# Patient Record
Sex: MALE | Race: BLACK OR AFRICAN AMERICAN | HISPANIC OR LATINO | Employment: UNEMPLOYED | URBAN - METROPOLITAN AREA
[De-identification: names, ages, dates, MRNs, and addresses within clinical notes are randomized per-mention and may not be internally consistent; named-entity substitution may affect disease eponyms.]

---

## 2020-09-25 ENCOUNTER — OFFICE VISIT (OUTPATIENT)
Dept: URGENT CARE | Age: 30
End: 2020-09-25
Payer: COMMERCIAL

## 2020-09-25 VITALS
HEART RATE: 74 BPM | WEIGHT: 172 LBS | DIASTOLIC BLOOD PRESSURE: 60 MMHG | RESPIRATION RATE: 18 BRPM | TEMPERATURE: 98 F | BODY MASS INDEX: 31.65 KG/M2 | SYSTOLIC BLOOD PRESSURE: 120 MMHG | HEIGHT: 62 IN | OXYGEN SATURATION: 100 %

## 2020-09-25 DIAGNOSIS — R17 JAUNDICE: Primary | ICD-10-CM

## 2020-09-25 PROCEDURE — 99283 EMERGENCY DEPT VISIT LOW MDM: CPT | Performed by: PHYSICIAN ASSISTANT

## 2020-09-25 PROCEDURE — G0382 LEV 3 HOSP TYPE B ED VISIT: HCPCS | Performed by: PHYSICIAN ASSISTANT

## 2020-09-25 PROCEDURE — 99203 OFFICE O/P NEW LOW 30 MIN: CPT | Performed by: PHYSICIAN ASSISTANT

## 2020-09-25 NOTE — PATIENT INSTRUCTIONS
Follow up with PCP in 3-5 days  Proceed to  ER if symptoms worsen  Discussed ED transfer patient at length  Patient opted for careful monitoring of symptoms  Advised patient needs blood work in the very near future  Monitor symptoms closely  Any increased symptoms report to emergency room immediately  Jaundice   WHAT YOU NEED TO KNOW:   Jaundice is yellowing of your skin and eyes  It is caused by high levels of bilirubin in your body  Bilirubin is a substance that is produced when the liver breaks down red blood cells  Jaundice may be a symptom of liver, pancreas, or gallbladder problems  Genetic disorders, medicines such as acetaminophen, or excess alcohol use may also cause jaundice  DISCHARGE INSTRUCTIONS:   Return to the emergency department if:   · You have severe abdominal pain or a fever  · You suddenly feel lightheaded or faint  Contact your healthcare provider if:   · You begin to have tea-colored urine or pale, gray bowel movements  · Your skin and eyes become more yellow, or other symptoms get worse  · You are confused, or others notice changes in your behavior  · You have questions or concerns about your condition or care  Medicines:   · Medicines  can decrease bilirubin levels and reduce your itching  · Take your medicine as directed  Contact your healthcare provider if you think your medicine is not helping or if you have side effects  Tell him of her if you are allergic to any medicine  Keep a list of the medicines, vitamins, and herbs you take  Include the amounts, and when and why you take them  Bring the list or the pill bottles to follow-up visits  Carry your medicine list with you in case of an emergency  Follow up with your healthcare provider as directed: You will need to return for more tests  Write down your questions so you remember to ask them during your visits  Manage your symptoms:   · Drink more liquids as directed    Liquids help you stay hydrated and urinate more  This helps prevent harm to your kidneys  Ask how much liquid to drink each day and which liquids are best for you  · Eat foods low in fat  Healthy low-fat foods include fruits, vegetables, whole-grain breads, low-fat dairy products, beans, lean meats, and fish  These foods are easier to digest and may help reduce your symptoms  · Do not drink alcohol  Alcohol harms your liver and may make your symptoms worse  © 2017 [employer] Information is for End User's use only and may not be sold, redistributed or otherwise used for commercial purposes  All illustrations and images included in CareNotes® are the copyrighted property of A D A M , Inc  or [employer]  The above information is an  only  It is not intended as medical advice for individual conditions or treatments  Talk to your doctor, nurse or pharmacist before following any medical regimen to see if it is safe and effective for you

## 2020-09-25 NOTE — PROGRESS NOTES
[facility] [employer] [facility]      NAME: Rodrick Pierre is a 27 y o  male  : 1990    MRN: [id number]  DATE: 2020  TIME: 6:12 PM    Assessment and Plan   Jaundice [R17]  1  Jaundice         Patient Instructions     Follow up with PCP in 3-5 days  Proceed to  ER if symptoms worsen  Discussed ED transfer patient at length  Patient opted for careful monitoring of symptoms  Advised patient needs blood work in the very near future  Monitor symptoms closely  Any increased symptoms report to emergency room immediately  Chief Complaint     Chief Complaint   Patient presents with    Eye Pain     symptoms started about 2 days ago  pt states his vision is blurry at times  History of Present Illness       Patient is a 80-year-old male presenting the office for yellow eyes"  Patient states that his symptoms have been ongoing past 2 days in duration  Patient states he has an extensive history liver disease but states that he does not know the particularly use of the disease  Patient states that 1 point his bilirubin was very elevated but was unable to provide any other information  Patient states that yesterday he felt feverish and fatigued but is currently asymptomatic  Patient denies any problems with his ears nose or throat  Patient has any shortness of breath chest tightness chest pain  Patient has any nausea vomiting diarrhea, abdominal pain  Patient denies any urinary symptoms  Patient denies any dizziness or lightheadedness  Patient offers no other complaints at this time  Review of Systems   Review of Systems   Constitutional: Negative  HENT: Negative  Eyes: Negative  Negative for photophobia, pain, discharge, redness, itching and visual disturbance  Respiratory: Negative  Cardiovascular: Negative  Gastrointestinal: Negative  Endocrine: Negative  Genitourinary: Negative  Musculoskeletal: Negative  Skin: Negative  Allergic/Immunologic: Negative  Neurological: Negative  Hematological: Negative  Psychiatric/Behavioral: Negative  Current Medications     No current outpatient medications on file  Current Allergies     Allergies as of 09/25/2020    (No Known Allergies)            The following portions of the patient's history were reviewed and updated as appropriate: allergies, current medications, past family history, past medical history, past social history, past surgical history and problem list      No past medical history on file  No past surgical history on file  No family history on file  Medications have been verified  Objective   /60   Pulse 74   Temp 98 °F (36 7 °C)   Resp 18   Ht 5' 2" (1 575 m)   Wt 78 kg (172 lb)   SpO2 100%   BMI 31 46 kg/m²        Physical Exam     Physical Exam  Vitals signs and nursing note reviewed  Constitutional:       Appearance: He is well-developed  HENT:      Head: Normocephalic  Eyes:      Conjunctiva/sclera: Conjunctivae normal       Pupils: Pupils are equal, round, and reactive to light  Neck:      Musculoskeletal: Normal range of motion  Cardiovascular:      Rate and Rhythm: Normal rate and regular rhythm  Heart sounds: Normal heart sounds  Pulmonary:      Effort: Pulmonary effort is normal       Breath sounds: Normal breath sounds  Skin:     General: Skin is warm  Capillary Refill: Capillary refill takes less than 2 seconds  Coloration: Skin is jaundiced (Minimal)  Findings: No bruising or lesion  Neurological:      Mental Status: He is alert and oriented to person, place, and time  Psychiatric:         Behavior: Behavior normal          Thought Content:  Thought content normal          Judgment: Judgment normal

## 2020-12-07 ENCOUNTER — LAB (OUTPATIENT)
Dept: LAB | Facility: MEDICAL CENTER | Age: 30
End: 2020-12-07
Payer: COMMERCIAL

## 2020-12-07 ENCOUNTER — OFFICE VISIT (OUTPATIENT)
Dept: GASTROENTEROLOGY | Facility: MEDICAL CENTER | Age: 30
End: 2020-12-07
Payer: COMMERCIAL

## 2020-12-07 VITALS
DIASTOLIC BLOOD PRESSURE: 71 MMHG | BODY MASS INDEX: 30.55 KG/M2 | HEART RATE: 70 BPM | WEIGHT: 166 LBS | SYSTOLIC BLOOD PRESSURE: 112 MMHG | HEIGHT: 62 IN | TEMPERATURE: 98.4 F

## 2020-12-07 DIAGNOSIS — E80.6 HYPERBILIRUBINEMIA: ICD-10-CM

## 2020-12-07 DIAGNOSIS — F10.10 ALCOHOL ABUSE: ICD-10-CM

## 2020-12-07 DIAGNOSIS — Z87.19 HISTORY OF BILIARY ATRESIA: ICD-10-CM

## 2020-12-07 DIAGNOSIS — R16.1 SPLENOMEGALY: ICD-10-CM

## 2020-12-07 DIAGNOSIS — E80.6 HYPERBILIRUBINEMIA: Primary | ICD-10-CM

## 2020-12-07 DIAGNOSIS — K74.60 CIRRHOSIS OF LIVER WITHOUT ASCITES, UNSPECIFIED HEPATIC CIRRHOSIS TYPE (HCC): ICD-10-CM

## 2020-12-07 LAB
AFP-TM SERPL-MCNC: 3 NG/ML (ref 0.5–8)
ALBUMIN SERPL BCP-MCNC: 3.4 G/DL (ref 3.5–5)
ALP SERPL-CCNC: 399 U/L (ref 46–116)
ALT SERPL W P-5'-P-CCNC: 62 U/L (ref 12–78)
ANION GAP SERPL CALCULATED.3IONS-SCNC: 5 MMOL/L (ref 4–13)
AST SERPL W P-5'-P-CCNC: 74 U/L (ref 5–45)
BASOPHILS # BLD AUTO: 0.03 THOUSANDS/ΜL (ref 0–0.1)
BASOPHILS NFR BLD AUTO: 1 % (ref 0–1)
BILIRUB DIRECT SERPL-MCNC: 6.1 MG/DL (ref 0–0.2)
BILIRUB SERPL-MCNC: 7.27 MG/DL (ref 0.2–1)
BUN SERPL-MCNC: 7 MG/DL (ref 5–25)
CALCIUM SERPL-MCNC: 10 MG/DL (ref 8.3–10.1)
CHLORIDE SERPL-SCNC: 104 MMOL/L (ref 100–108)
CO2 SERPL-SCNC: 29 MMOL/L (ref 21–32)
CREAT SERPL-MCNC: 0.81 MG/DL (ref 0.6–1.3)
EOSINOPHIL # BLD AUTO: 0.09 THOUSAND/ΜL (ref 0–0.61)
EOSINOPHIL NFR BLD AUTO: 2 % (ref 0–6)
ERYTHROCYTE [DISTWIDTH] IN BLOOD BY AUTOMATED COUNT: 15.5 % (ref 11.6–15.1)
FERRITIN SERPL-MCNC: 237 NG/ML (ref 8–388)
GFR SERPL CREATININE-BSD FRML MDRD: 138 ML/MIN/1.73SQ M
GGT SERPL-CCNC: 156 U/L (ref 5–85)
GLUCOSE P FAST SERPL-MCNC: 103 MG/DL (ref 65–99)
HCT VFR BLD AUTO: 43.7 % (ref 36.5–49.3)
HGB BLD-MCNC: 13.9 G/DL (ref 12–17)
IGA SERPL-MCNC: 358 MG/DL (ref 70–400)
IGG SERPL-MCNC: 1280 MG/DL (ref 700–1600)
IGM SERPL-MCNC: 85 MG/DL (ref 40–230)
IMM GRANULOCYTES # BLD AUTO: 0.05 THOUSAND/UL (ref 0–0.2)
IMM GRANULOCYTES NFR BLD AUTO: 1 % (ref 0–2)
INR PPP: 0.98 (ref 0.84–1.19)
IRON SATN MFR SERPL: 22 %
IRON SERPL-MCNC: 76 UG/DL (ref 65–175)
LYMPHOCYTES # BLD AUTO: 0.93 THOUSANDS/ΜL (ref 0.6–4.47)
LYMPHOCYTES NFR BLD AUTO: 25 % (ref 14–44)
MCH RBC QN AUTO: 29 PG (ref 26.8–34.3)
MCHC RBC AUTO-ENTMCNC: 31.8 G/DL (ref 31.4–37.4)
MCV RBC AUTO: 91 FL (ref 82–98)
MONOCYTES # BLD AUTO: 0.31 THOUSAND/ΜL (ref 0.17–1.22)
MONOCYTES NFR BLD AUTO: 8 % (ref 4–12)
NEUTROPHILS # BLD AUTO: 2.35 THOUSANDS/ΜL (ref 1.85–7.62)
NEUTS SEG NFR BLD AUTO: 63 % (ref 43–75)
NRBC BLD AUTO-RTO: 0 /100 WBCS
PLATELET # BLD AUTO: 204 THOUSANDS/UL (ref 149–390)
PMV BLD AUTO: 11.4 FL (ref 8.9–12.7)
POTASSIUM SERPL-SCNC: 3.8 MMOL/L (ref 3.5–5.3)
PROT SERPL-MCNC: 8 G/DL (ref 6.4–8.2)
PROTHROMBIN TIME: 13 SECONDS (ref 11.6–14.5)
RBC # BLD AUTO: 4.8 MILLION/UL (ref 3.88–5.62)
SODIUM SERPL-SCNC: 138 MMOL/L (ref 136–145)
TIBC SERPL-MCNC: 343 UG/DL (ref 250–450)
WBC # BLD AUTO: 3.76 THOUSAND/UL (ref 4.31–10.16)

## 2020-12-07 PROCEDURE — 85610 PROTHROMBIN TIME: CPT

## 2020-12-07 PROCEDURE — 83550 IRON BINDING TEST: CPT

## 2020-12-07 PROCEDURE — 86038 ANTINUCLEAR ANTIBODIES: CPT

## 2020-12-07 PROCEDURE — 83540 ASSAY OF IRON: CPT

## 2020-12-07 PROCEDURE — 82103 ALPHA-1-ANTITRYPSIN TOTAL: CPT

## 2020-12-07 PROCEDURE — 80076 HEPATIC FUNCTION PANEL: CPT

## 2020-12-07 PROCEDURE — 80048 BASIC METABOLIC PNL TOTAL CA: CPT

## 2020-12-07 PROCEDURE — 82390 ASSAY OF CERULOPLASMIN: CPT

## 2020-12-07 PROCEDURE — 86235 NUCLEAR ANTIGEN ANTIBODY: CPT

## 2020-12-07 PROCEDURE — 82728 ASSAY OF FERRITIN: CPT

## 2020-12-07 PROCEDURE — 85025 COMPLETE CBC W/AUTO DIFF WBC: CPT

## 2020-12-07 PROCEDURE — 86256 FLUORESCENT ANTIBODY TITER: CPT

## 2020-12-07 PROCEDURE — 82105 ALPHA-FETOPROTEIN SERUM: CPT

## 2020-12-07 PROCEDURE — 86376 MICROSOMAL ANTIBODY EACH: CPT

## 2020-12-07 PROCEDURE — 36415 COLL VENOUS BLD VENIPUNCTURE: CPT

## 2020-12-07 PROCEDURE — 82977 ASSAY OF GGT: CPT

## 2020-12-07 PROCEDURE — 82784 ASSAY IGA/IGD/IGG/IGM EACH: CPT

## 2020-12-07 PROCEDURE — 99204 OFFICE O/P NEW MOD 45 MIN: CPT | Performed by: INTERNAL MEDICINE

## 2020-12-07 RX ORDER — HYDROXYZINE HYDROCHLORIDE 25 MG/1
25 TABLET, FILM COATED ORAL EVERY 6 HOURS PRN
COMMUNITY
Start: 2020-11-24

## 2020-12-07 RX ORDER — URSODIOL 300 MG/1
300 CAPSULE ORAL 2 TIMES DAILY
COMMUNITY
Start: 2020-09-30 | End: 2020-12-09

## 2020-12-07 RX ORDER — OMEPRAZOLE 20 MG/1
20 CAPSULE, DELAYED RELEASE ORAL DAILY
COMMUNITY

## 2020-12-08 ENCOUNTER — HOSPITAL ENCOUNTER (OUTPATIENT)
Dept: RADIOLOGY | Facility: HOSPITAL | Age: 30
Discharge: HOME | End: 2020-12-08
Attending: INTERNAL MEDICINE
Payer: COMMERCIAL

## 2020-12-08 DIAGNOSIS — E80.6 HYPERBILIRUBINEMIA: ICD-10-CM

## 2020-12-08 LAB
A1AT SERPL-MCNC: 219 MG/DL (ref 95–164)
ACTIN IGG SERPL-ACNC: 6 UNITS (ref 0–19)
CERULOPLASMIN SERPL-MCNC: 44.5 MG/DL (ref 16–31)
LKM-1 AB SER-ACNC: <20.1 UNITS (ref 0–20)
MITOCHONDRIA M2 IGG SER-ACNC: <20 UNITS (ref 0–20)

## 2020-12-08 PROCEDURE — G1004 CDSM NDSC: HCPCS

## 2020-12-08 PROCEDURE — 74183 MRI ABD W/O CNTR FLWD CNTR: CPT

## 2020-12-08 PROCEDURE — A9585 GADOBUTROL INJECTION: HCPCS | Performed by: INTERNAL MEDICINE

## 2020-12-08 RX ADMIN — GADOBUTROL 8 ML: 604.72 INJECTION INTRAVENOUS at 15:58

## 2020-12-09 ENCOUNTER — TELEPHONE (OUTPATIENT)
Dept: GASTROENTEROLOGY | Facility: CLINIC | Age: 30
End: 2020-12-09

## 2020-12-09 DIAGNOSIS — Q44.2 BILIARY ATRESIA: ICD-10-CM

## 2020-12-09 DIAGNOSIS — K74.60 CIRRHOSIS OF LIVER WITHOUT ASCITES, UNSPECIFIED HEPATIC CIRRHOSIS TYPE (HCC): Primary | ICD-10-CM

## 2020-12-09 DIAGNOSIS — F10.10 ALCOHOL ABUSE: ICD-10-CM

## 2020-12-09 LAB — RYE IGE QN: NEGATIVE

## 2020-12-10 ENCOUNTER — TELEPHONE (OUTPATIENT)
Dept: GASTROENTEROLOGY | Facility: MEDICAL CENTER | Age: 30
End: 2020-12-10

## 2021-01-11 ENCOUNTER — LAB (OUTPATIENT)
Dept: LAB | Facility: MEDICAL CENTER | Age: 31
End: 2021-01-11
Payer: COMMERCIAL

## 2021-01-11 ENCOUNTER — OFFICE VISIT (OUTPATIENT)
Dept: GASTROENTEROLOGY | Facility: MEDICAL CENTER | Age: 31
End: 2021-01-11
Payer: COMMERCIAL

## 2021-01-11 VITALS
TEMPERATURE: 98.6 F | DIASTOLIC BLOOD PRESSURE: 69 MMHG | SYSTOLIC BLOOD PRESSURE: 102 MMHG | WEIGHT: 168 LBS | BODY MASS INDEX: 30.73 KG/M2

## 2021-01-11 DIAGNOSIS — K74.60 CIRRHOSIS OF LIVER WITHOUT ASCITES, UNSPECIFIED HEPATIC CIRRHOSIS TYPE (HCC): ICD-10-CM

## 2021-01-11 DIAGNOSIS — Z87.19 HISTORY OF BILIARY ATRESIA: ICD-10-CM

## 2021-01-11 DIAGNOSIS — K74.60 CIRRHOSIS OF LIVER WITHOUT ASCITES, UNSPECIFIED HEPATIC CIRRHOSIS TYPE (HCC): Primary | ICD-10-CM

## 2021-01-11 DIAGNOSIS — R79.89 ELEVATED LIVER FUNCTION TESTS: ICD-10-CM

## 2021-01-11 LAB
ALBUMIN SERPL BCP-MCNC: 3.3 G/DL (ref 3.5–5)
ALP SERPL-CCNC: 521 U/L (ref 46–116)
ALT SERPL W P-5'-P-CCNC: 45 U/L (ref 12–78)
AST SERPL W P-5'-P-CCNC: 78 U/L (ref 5–45)
BILIRUB DIRECT SERPL-MCNC: 6.23 MG/DL (ref 0–0.2)
BILIRUB SERPL-MCNC: 7.08 MG/DL (ref 0.2–1)
PROT SERPL-MCNC: 8 G/DL (ref 6.4–8.2)

## 2021-01-11 PROCEDURE — 36415 COLL VENOUS BLD VENIPUNCTURE: CPT

## 2021-01-11 PROCEDURE — 80076 HEPATIC FUNCTION PANEL: CPT

## 2021-01-11 PROCEDURE — 99214 OFFICE O/P EST MOD 30 MIN: CPT | Performed by: INTERNAL MEDICINE

## 2021-01-11 NOTE — PROGRESS NOTES
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, alcohol abuse, cirrhosis who presents for follow-up evaluation  ASSESSMENT AND PLAN:      1  Cirrhosis of liver without ascites, unspecified hepatic cirrhosis type ([address] )  2  History of biliary atresia  3  Elevated liver function tests  He was evaluated in the GI office December 2020 after discharge for an outside hospital for new jaundice  He has history of biliary atresia and underwent Kasai procedure as an infant  Imaging at an outside hospital showed new cirrhosis and evidence of portal hypertension  He had significant alcohol use at that time with 1 in a case of beer per day for the past 3-6 months but has since discontinued alcohol use since September  He underwent repeat imaging here showing dilation of his central and peripheral intrahepatic biliary tree  His cirrhosis is likely a combination of heavy alcohol use and 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 given his abnormal imaging concerning for intrahepatic biliary obstruction  I will recheck liver enzymes today to see if there has been any improvement given that he has been 4 months sober from alcohol  He will continue to use hydroxyzine as needed and topical creams for his pruritus  He was referred to [facility] transplant given new cirrhosis, history of biliary atresia and abnormal imaging  I will touch base with the [facility] transplant team to arrange for him to be contacted for an appointment  We discussed he may need intervention of his biliary tree given possible obstruction seen on recent MRI/MRCP  He has no indication for liver transplant at this time, however would be important to establish with the transplant program given his underlying chronic liver disease  MELD 14   Chronic liver disease workup was negative  Cirrhosis is likely combination of alcohol use, 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  Portal hypertension:  EGD 2020 at outside hospital with portal hypertensive gastropathy and no varices  Repeat EGD in 1 year for surveillance   Ascites:  None  Encephalopathy:  None  HCC screening:  AFP negative and abdominal ultrasound without focal lesion  Repeat every 6 months  I reiterated strict alcohol cessation again with him today  Follow-up in 2 months    - Hepatic function panel; Future    ______________________________________________________________________    SUBJECTIVE:  58-year-old male with 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, alcohol abuse, cirrhosis who presents for follow-up evaluation  Patient is Iranian-speaking only and translation is assisted by [employer]  services  He was seen in the GI office December 2020  At that time he had previously presented to an outside hospital with jaundice  At the outside hospital he underwent MRCP showing no biliary ductal dilation, mild hepatic nodularity suggestive of cirrhosis and splenomegaly  He also underwent EGD showing portal hypertensive gastropathy no gastric or esophageal varices  His total bilirubin continue to increase after discharge  He additionally was having heavy alcohol use with a case of beer per day for 3-6 months prior to his presentation and has stopped drinking in September 2020  Interval history:  He underwent repeat MRI/MRCP 12/08/2020 showing moderate central and mild peripheral intrahepatic biliary ductal dilatation with diffusely irregular appearance of the biliary tree  His small-bowel/biliary anastomosis could not be visualized  Additionally there was splenomegaly and prominent portal vein, cirrhotic appearing liver  Hepatic function panel at that time showed total bilirubin 7 2, direct 6 1, alkaline phosphatase 399, AST 74 ALT 62     Prior workup for genetic/metabolic and autoimmune causes of liver disease were unremarkable  He is referred for evaluation with the [facility] transplant team given his complex history, new cirrhosis and abnormal imaging  He states he has not yet been contacted by [facility] to arrange an appointment  He continues to have significant jaundice, pruritus  He reports that he is not sleeping well  He denies abdominal pain  He states his urine is dark and his stool does alternate between brown and a light color  He continues to have abstain from alcohol with his last drink in September 2020  REVIEW OF SYSTEMS IS OTHERWISE NEGATIVE  Ten point review of systems negative other than stated as per HPI    Historical Information   No past medical history on file  No past surgical history on file  Social History   Social History     Substance and Sexual Activity   Alcohol Use Never    Frequency: Never     Social History     Substance and Sexual Activity   Drug Use Not Currently     Social History     Tobacco Use   Smoking Status Never Smoker   Smokeless Tobacco Never Used     No family history on file  Meds/Allergies       Current Outpatient Medications:     hydrOXYzine HCL (ATARAX) 25 mg tablet    omeprazole (PriLOSEC) 20 mg delayed release capsule    No Known Allergies        Objective     Blood pressure 102/69, temperature 98 6 °F (37 °C), temperature source Tympanic, weight 76 2 kg (168 lb)  Body mass index is 30 73 kg/m²  PHYSICAL EXAM:      General Appearance:   Alert, cooperative, no distress   HEENT:   Normocephalic, atraumatic,  Icteric sclera   Neck:  Supple, symmetrical, trachea midline   Lungs:   Clear to auscultation bilaterally; no rales, rhonchi or wheezing; respirations unlabored    Heart[de-identified]   Regular rate and rhythm; no murmur, rub, or gallop     Abdomen:   Soft, non-tender, non-distended; normal bowel sounds; no masses, no organomegaly    Genitalia:   Deferred    Rectal:   Deferred    Extremities:  No cyanosis, clubbing or edema    Pulses:  2+ and symmetric    Skin:  Notable for jaundice, excoriations on his upper and lower back due to pruritus, small spider angiomata on the upper chest    Lymph nodes:  No palpable cervical lymphadenopathy        Lab Results:   No visits with results within 1 Day(s) from this visit     Latest known visit with results is:   Lab on 12/07/2020   Component Date Value    AFP TUMOR MARKER 12/07/2020 3 0     LUPE 12/07/2020 Negative     Mitochondrial Ab 12/07/2020 <20 0     Smooth Muscle Ab 12/07/2020 6     IGA 12/07/2020 358 0     IGG 12/07/2020 1,280 0     IGM 12/07/2020 85 0     Liver-Kidney Microsomal * 12/07/2020 <20 1     Total Bilirubin 12/07/2020 7 27*    Bilirubin, Direct 12/07/2020 6 10*    Alkaline Phosphatase 12/07/2020 399*    AST 12/07/2020 74*    ALT 12/07/2020 62     Total Protein 12/07/2020 8 0     Albumin 12/07/2020 3 4*    Protime 12/07/2020 13 0     INR 12/07/2020 0 98     WBC 12/07/2020 3 76*    RBC 12/07/2020 4 80     Hemoglobin 12/07/2020 13 9     Hematocrit 12/07/2020 43 7     MCV 12/07/2020 91     MCH 12/07/2020 29 0     MCHC 12/07/2020 31 8     RDW 12/07/2020 15 5*    MPV 12/07/2020 11 4     Platelets 02/52/1252 204     nRBC 12/07/2020 0     Neutrophils Relative 12/07/2020 63     Immat GRANS % 12/07/2020 1     Lymphocytes Relative 12/07/2020 25     Monocytes Relative 12/07/2020 8     Eosinophils Relative 12/07/2020 2     Basophils Relative 12/07/2020 1     Neutrophils Absolute 12/07/2020 2 35     Immature Grans Absolute 12/07/2020 0 05     Lymphocytes Absolute 12/07/2020 0 93     Monocytes Absolute 12/07/2020 0 31     Eosinophils Absolute 12/07/2020 0 09     Basophils Absolute 12/07/2020 0 03     Sodium 12/07/2020 138     Potassium 12/07/2020 3 8     Chloride 12/07/2020 104     CO2 12/07/2020 29     ANION GAP 12/07/2020 5     BUN 12/07/2020 7     Creatinine 12/07/2020 0 81     Glucose, Fasting 12/07/2020 103*    Calcium 12/07/2020 10 0     eGFR 12/07/2020 138     GGT 12/07/2020 156*    A-1 Antitrypsin 12/07/2020 219*    Ceruloplasmin 12/07/2020 44 5*    Iron Saturation 12/07/2020 22     TIBC 12/07/2020 343     Iron 12/07/2020 76     Ferritin 12/07/2020 237          Radiology Results:   No results found

## 2021-01-12 NOTE — RESULT ENCOUNTER NOTE
My medical assistant will call patient with results  Please let him know that his bilirubin and liver function tests are overall stable    We have called [facility] and they will be in contact with him to schedule an appointment soon

## 2021-01-18 ENCOUNTER — TELEPHONE (OUTPATIENT)
Dept: GASTROENTEROLOGY | Facility: MEDICAL CENTER | Age: 31
End: 2021-01-18

## 2021-01-18 NOTE — TELEPHONE ENCOUNTER
Pt called looking for [facility] phone number, was supposed to have a call from them to determine care, never called him-gave pt phone number for Dr Barry Villegas office

## 2021-01-26 ENCOUNTER — OFFICE VISIT (OUTPATIENT)
Dept: GASTROENTEROLOGY | Facility: MEDICAL CENTER | Age: 31
End: 2021-01-26
Payer: COMMERCIAL

## 2021-01-26 ENCOUNTER — PREP FOR PROCEDURE (OUTPATIENT)
Dept: GASTROENTEROLOGY | Facility: CLINIC | Age: 31
End: 2021-01-26

## 2021-01-26 VITALS
BODY MASS INDEX: 30 KG/M2 | TEMPERATURE: 98.2 F | WEIGHT: 164 LBS | SYSTOLIC BLOOD PRESSURE: 110 MMHG | DIASTOLIC BLOOD PRESSURE: 68 MMHG | HEART RATE: 75 BPM

## 2021-01-26 DIAGNOSIS — Z87.19 HISTORY OF BILIARY ATRESIA: ICD-10-CM

## 2021-01-26 DIAGNOSIS — K74.60 CIRRHOSIS OF LIVER WITHOUT ASCITES, UNSPECIFIED HEPATIC CIRRHOSIS TYPE (HCC): Primary | ICD-10-CM

## 2021-01-26 DIAGNOSIS — E80.6 HYPERBILIRUBINEMIA: Primary | ICD-10-CM

## 2021-01-26 PROCEDURE — 99214 OFFICE O/P EST MOD 30 MIN: CPT | Performed by: INTERNAL MEDICINE

## 2021-01-26 NOTE — PROGRESS NOTES
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  He has recent elevated total bilirubin and imaging showing cirrhosis and moderate central and mild peripheral intrahepatic biliary ductal dilatation with diffusely irregular appearance of the biliary tree  His increased liver function tests in September 2020 was in the setting of significant heavy alcohol use however he has abstained from alcohol for the past 4 months  He was referred to [facility] hepatology however missed their phone call for visit on 01/18  We have verify that [facility] has his correct number and they will be calling him for rescheduling of his visit  He and his partner today are requesting hospitalization to decrease his bilirubin level  I discussed that his elevated bilirubin is likely a combination of underlying cirrhosis and possible underlying intrahepatic biliary obstructive component given his abnormal MRI/MRCP  I discussed this case with our advanced endoscopy team and they will attempt push enteroscopy/ ERCP to try to reach his small-bowel/hepatic anastomosis and to evaluate if any intervention can be performed to relieve possible obstruction  I discussed this with the patient  I also discussed that if his area of obstruction is too proximal and may not be amenable to endoscopic treatment  He will still keep his appointment at [facility] for further evaluation     MELD 15  Chronic liver disease workup was negative    Cirrhosis is likely combination of alcohol use, 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  Portal hypertension:  EGD 2020 at outside hospital with portal hypertensive gastropathy and no varices  Repeat EGD in 1 year for surveillance   Ascites:  None  Encephalopathy:  None  HCC screening:  AFP negative  Recent abdominal ultrasound with hyperechoic lesion possible fatty infiltration however ultrasound 1 month prior and MRI and abdominal ultrasound one month prior were unremarkable  I reiterated strict alcohol cessation again with him today  Follow-up in 3 months     ______________________________________________________________________    SUBJECTIVE:  55-year-old male with 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, alcohol abuse, cirrhosis who presents for follow-up evaluation  Patient is English-speaking only and translation is assisted by [employer]  services  Interval history:  He was last seen in the office 2 weeks ago  He was referred to [facility] about autoimmune hepatitis  He presented to the emergency room yesterday with complaints of itching  Total bilirubin was 10 3 which was increased from 7 0 2 weeks prior  He also underwent abdominal ultrasound showing no biliary dilatation, hyperechoic hepatic lesion that may represent focal fatty infiltration  He continues to have diffuse pruritus which wakes him at night  He tried hydroxyzine and cream with no improvement  He has continued to abstain from alcohol for the past 4 months  His last drink was in September  He was scheduled with [facility] on 01/18 and further documentation was called 3 times but had no answer  Reports that [facility] had the wrong number and he did not receive their phone call  Prior history:   :He was seen in the GI office December 2020  At that time he had previously presented to an outside hospital with jaundice    At the outside hospital he underwent MRCP showing no biliary ductal dilation, mild hepatic nodularity suggestive of cirrhosis and splenomegaly  He also underwent EGD showing portal hypertensive gastropathy no gastric or esophageal varices  His total bilirubin continue to increase after discharge  He additionally was having heavy alcohol use with a case of beer per day for 3-6 months prior to his presentation and has stopped drinking in September 2020       He underwent repeat MRI/MRCP 12/08/2020 showing moderate central and mild peripheral intrahepatic biliary ductal dilatation with diffusely irregular appearance of the biliary tree  His small-bowel/biliary anastomosis could not be visualized  Additionally there was splenomegaly and prominent portal vein, cirrhotic appearing liver  Hepatic function panel at that time showed total bilirubin 7 2, direct 6 1, alkaline phosphatase 399, AST 74 ALT 62  Prior workup for genetic/metabolic and autoimmune causes of liver disease were unremarkable  He is referred for evaluation with the [facility]\"" transplant team given his complex history, new cirrhosis and abnormal imaging          REVIEW OF SYSTEMS IS OTHERWISE NEGATIVE  Ten point review of systems is negative other than stated as per HPI    Historical Information   History reviewed  No pertinent past medical history  History reviewed  No pertinent surgical history  Social History   Social History     Substance and Sexual Activity   Alcohol Use Never    Frequency: Never     Social History     Substance and Sexual Activity   Drug Use Not Currently     Social History     Tobacco Use   Smoking Status Never Smoker   Smokeless Tobacco Never Used     History reviewed  No pertinent family history  Meds/Allergies       Current Outpatient Medications:     hydrOXYzine HCL (ATARAX) 25 mg tablet    omeprazole (PriLOSEC) 20 mg delayed release capsule  No current facility-administered medications for this visit  No Known Allergies        Objective     Blood pressure 110/68, pulse 75, temperature 98 2 °F (36 8 °C), weight 74 4 kg (164 lb)   Body mass index is 30 kg/m²  PHYSICAL EXAM:      General Appearance:   Alert, cooperative, no distress   HEENT:   Normocephalic, atraumatic,  Icteric sclera   Neck:  Supple, symmetrical, trachea midline   Lungs:   Clear to auscultation bilaterally; no rales, rhonchi or wheezing; respirations unlabored    Heart[de-identified]   Regular rate and rhythm; no murmur, rub, or gallop  Abdomen:   Soft, non-tender, non-distended; normal bowel sounds; no masses, no organomegaly    Genitalia:   Deferred    Rectal:   Deferred    Extremities:  No cyanosis, clubbing or edema    Pulses:  2+ and symmetric    Skin:  Jaundice, excoriations on arm and upper back due to itching   Lymph nodes:  No palpable cervical lymphadenopathy        Lab Results:   No visits with results within 1 Day(s) from this visit  Latest known visit with results is:   Lab on 01/11/2021   Component Date Value    Total Bilirubin 01/11/2021 7 08*    Bilirubin, Direct 01/11/2021 6 23*    Alkaline Phosphatase 01/11/2021 521*    AST 01/11/2021 78*    ALT 01/11/2021 45     Total Protein 01/11/2021 8 0     Albumin 01/11/2021 3 3*         Radiology Results:   No results found

## 2021-02-04 ENCOUNTER — TELEPHONE (OUTPATIENT)
Dept: GASTROENTEROLOGY | Facility: CLINIC | Age: 31
End: 2021-02-04

## 2021-02-04 NOTE — TELEPHONE ENCOUNTER
Kuldeep Huang conference call yesterday [facility] referral was closed  He is still seeing Hepatology on 1/22/21  On 1/25 he was at [facility] ER with yellow skin all over  Was discharged

## 2021-02-08 ENCOUNTER — TELEPHONE (OUTPATIENT)
Dept: GASTROENTEROLOGY | Facility: CLINIC | Age: 31
End: 2021-02-08

## 2021-02-08 NOTE — TELEPHONE ENCOUNTER
----- Message from Maryanne Ronquillo MD sent at 2/6/2021  3:46 PM EST -----  [facility]  Surgery staff : please help schedule this patient for an ERCP with me next week (Friday?) or the following week (18th?)  Please update me  Thank you       Dorothy Garcia

## 2021-02-08 NOTE — TELEPHONE ENCOUNTER
ERCP scheduled on 2/18/21 with Dr Franks at [facility]  I gave pt verbal instructions/emailed to [email or web address]

## 2021-02-17 ENCOUNTER — ANESTHESIA EVENT (OUTPATIENT)
Dept: GASTROENTEROLOGY | Facility: HOSPITAL | Age: 31
End: 2021-02-17

## 2021-02-18 ENCOUNTER — HOSPITAL ENCOUNTER (OUTPATIENT)
Dept: RADIOLOGY | Facility: HOSPITAL | Age: 31
Discharge: HOME | End: 2021-02-18
Payer: COMMERCIAL

## 2021-02-18 ENCOUNTER — ANESTHESIA (OUTPATIENT)
Dept: GASTROENTEROLOGY | Facility: HOSPITAL | Age: 31
End: 2021-02-18

## 2021-02-18 ENCOUNTER — HOSPITAL ENCOUNTER (OUTPATIENT)
Dept: GASTROENTEROLOGY | Facility: HOSPITAL | Age: 31
Setting detail: OUTPATIENT SURGERY
Discharge: HOME | End: 2021-02-18
Attending: INTERNAL MEDICINE
Payer: COMMERCIAL

## 2021-02-18 VITALS
HEIGHT: 62 IN | DIASTOLIC BLOOD PRESSURE: 57 MMHG | TEMPERATURE: 98.2 F | SYSTOLIC BLOOD PRESSURE: 103 MMHG | OXYGEN SATURATION: 97 % | BODY MASS INDEX: 30.18 KG/M2 | HEART RATE: 102 BPM | WEIGHT: 164 LBS | RESPIRATION RATE: 18 BRPM

## 2021-02-18 VITALS — HEART RATE: 113 BPM

## 2021-02-18 DIAGNOSIS — E80.6 HYPERBILIRUBINEMIA: ICD-10-CM

## 2021-02-18 DIAGNOSIS — Z87.19 HISTORY OF BILIARY ATRESIA: ICD-10-CM

## 2021-02-18 PROCEDURE — 74018 RADEX ABDOMEN 1 VIEW: CPT

## 2021-02-18 PROCEDURE — 43239 EGD BIOPSY SINGLE/MULTIPLE: CPT | Performed by: INTERNAL MEDICINE

## 2021-02-18 PROCEDURE — 43251 EGD REMOVE LESION SNARE: CPT | Performed by: INTERNAL MEDICINE

## 2021-02-18 PROCEDURE — 88305 TISSUE EXAM BY PATHOLOGIST: CPT | Performed by: PATHOLOGY

## 2021-02-18 RX ORDER — PROPOFOL 10 MG/ML
INJECTION, EMULSION INTRAVENOUS AS NEEDED
Status: DISCONTINUED | OUTPATIENT
Start: 2021-02-18 | End: 2021-02-18

## 2021-02-18 RX ORDER — FENTANYL CITRATE 50 UG/ML
INJECTION, SOLUTION INTRAMUSCULAR; INTRAVENOUS AS NEEDED
Status: DISCONTINUED | OUTPATIENT
Start: 2021-02-18 | End: 2021-02-18

## 2021-02-18 RX ORDER — SODIUM CHLORIDE, SODIUM LACTATE, POTASSIUM CHLORIDE, CALCIUM CHLORIDE 600; 310; 30; 20 MG/100ML; MG/100ML; MG/100ML; MG/100ML
INJECTION, SOLUTION INTRAVENOUS CONTINUOUS PRN
Status: DISCONTINUED | OUTPATIENT
Start: 2021-02-18 | End: 2021-02-18

## 2021-02-18 RX ORDER — LIDOCAINE HYDROCHLORIDE 10 MG/ML
INJECTION, SOLUTION EPIDURAL; INFILTRATION; INTRACAUDAL; PERINEURAL AS NEEDED
Status: DISCONTINUED | OUTPATIENT
Start: 2021-02-18 | End: 2021-02-18

## 2021-02-18 RX ORDER — SUCCINYLCHOLINE/SOD CL,ISO/PF 100 MG/5ML
SYRINGE (ML) INTRAVENOUS AS NEEDED
Status: DISCONTINUED | OUTPATIENT
Start: 2021-02-18 | End: 2021-02-18

## 2021-02-18 RX ORDER — ONDANSETRON 2 MG/ML
INJECTION INTRAMUSCULAR; INTRAVENOUS AS NEEDED
Status: DISCONTINUED | OUTPATIENT
Start: 2021-02-18 | End: 2021-02-18

## 2021-02-18 RX ORDER — DEXAMETHASONE SODIUM PHOSPHATE 10 MG/ML
INJECTION, SOLUTION INTRAMUSCULAR; INTRAVENOUS AS NEEDED
Status: DISCONTINUED | OUTPATIENT
Start: 2021-02-18 | End: 2021-02-18

## 2021-02-18 RX ORDER — ROCURONIUM BROMIDE 10 MG/ML
INJECTION, SOLUTION INTRAVENOUS AS NEEDED
Status: DISCONTINUED | OUTPATIENT
Start: 2021-02-18 | End: 2021-02-18

## 2021-02-18 RX ADMIN — PHENYLEPHRINE HYDROCHLORIDE 100 MCG: 10 INJECTION INTRAVENOUS at 15:31

## 2021-02-18 RX ADMIN — PROPOFOL 200 MG: 10 INJECTION, EMULSION INTRAVENOUS at 14:50

## 2021-02-18 RX ADMIN — ROCURONIUM BROMIDE 30 MG: 50 INJECTION, SOLUTION INTRAVENOUS at 15:32

## 2021-02-18 RX ADMIN — FENTANYL CITRATE 100 MCG: 50 INJECTION INTRAMUSCULAR; INTRAVENOUS at 14:50

## 2021-02-18 RX ADMIN — ONDANSETRON 4 MG: 2 INJECTION INTRAMUSCULAR; INTRAVENOUS at 14:56

## 2021-02-18 RX ADMIN — DEXAMETHASONE SODIUM PHOSPHATE 4 MG: 10 INJECTION, SOLUTION INTRAMUSCULAR; INTRAVENOUS at 14:56

## 2021-02-18 RX ADMIN — ROCURONIUM BROMIDE 10 MG: 50 INJECTION, SOLUTION INTRAVENOUS at 14:54

## 2021-02-18 RX ADMIN — ROCURONIUM BROMIDE 20 MG: 50 INJECTION, SOLUTION INTRAVENOUS at 16:18

## 2021-02-18 RX ADMIN — SUGAMMADEX 200 MG: 100 INJECTION, SOLUTION INTRAVENOUS at 16:55

## 2021-02-18 RX ADMIN — LIDOCAINE HYDROCHLORIDE 50 MG: 10 INJECTION, SOLUTION EPIDURAL; INFILTRATION; INTRACAUDAL; PERINEURAL at 14:50

## 2021-02-18 RX ADMIN — FENTANYL CITRATE 50 MCG: 50 INJECTION INTRAMUSCULAR; INTRAVENOUS at 16:49

## 2021-02-18 RX ADMIN — Medication 100 MG: at 14:50

## 2021-02-18 RX ADMIN — SODIUM CHLORIDE, SODIUM LACTATE, POTASSIUM CHLORIDE, AND CALCIUM CHLORIDE: .6; .31; .03; .02 INJECTION, SOLUTION INTRAVENOUS at 14:48

## 2021-02-18 RX ADMIN — PHENYLEPHRINE HYDROCHLORIDE 100 MCG: 10 INJECTION INTRAVENOUS at 15:09

## 2021-02-18 RX ADMIN — ROCURONIUM BROMIDE 10 MG: 50 INJECTION, SOLUTION INTRAVENOUS at 15:05

## 2021-02-18 NOTE — H&P
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  Presents with jaundice and imaging finding of duct dilation  Review of Systems    Historical Information   History reviewed  No pertinent past medical history  History reviewed  No pertinent surgical history  Social History   Social History     Substance and Sexual Activity   Alcohol Use Never    Frequency: Never     Social History     Substance and Sexual Activity   Drug Use Not Currently     Social History     Tobacco Use   Smoking Status Never Smoker   Smokeless Tobacco Never Used     History reviewed  No pertinent family history  Meds/Allergies     (Not in a hospital admission)      No Known Allergies    Objective     /61   Pulse (!) 108   Temp 99 8 °F (37 7 °C) (Tympanic)   Resp 18   Ht 5' 2" (1 575 m)   Wt 74 4 kg (164 lb)   SpO2 100%   BMI 30 00 kg/m²       PHYSICAL EXAM    Gen: NAD  CV: RRR  CHEST: Clear  ABD: soft, NT/ND  EXT: no edema  Neuro: AAO      ASSESSMENT/PLAN:  This is a 27y o  year old male here for ERCP for evaluation of biliary enteric anastomosis in a patient with a history of Kasai procedure who presents with jaundice and dilated bile ducts  PLAN:   Procedure:  ERCP  Discussed the altered anatomy with the patient  This may prove the procedure to be challenging and he may not be able to complete it  He is agreeable with an attempt

## 2021-02-18 NOTE — ANESTHESIA POSTPROCEDURE EVALUATION
Post-Op Assessment Note    CV Status:  Stable  Pain Score: 0    Pain management: adequate     Mental Status:  Awake and sleepy   PONV Controlled:  None   Airway Patency:  Patent   Two or more mitigation strategies used for obstructive sleep apnea   Post Op Vitals Reviewed: Yes      Staff: CRNA         No complications documented      /56 (02/18/21 1713)    Temp 98 2 °F (36 8 °C) (02/18/21 1713)    Pulse 93 (02/18/21 1713)   Resp 20 (02/18/21 1713)    SpO2 99 % (02/18/21 1713)

## 2021-02-18 NOTE — ANESTHESIA PREPROCEDURE EVALUATION
Procedure:  ERCP    Relevant Problems   ANESTHESIA (within normal limits)      CARDIO (within normal limits)      ENDO (within normal limits)      GI/HEPATIC (within normal limits)      /RENAL (within normal limits)      GYN (within normal limits)      HEMATOLOGY (within normal limits)      MUSCULOSKELETAL (within normal limits)      NEURO/PSYCH   (+) History of biliary atresia      PULMONARY (within normal limits)   NPO verified      Results for Martina Lundy (MRN [id number]) as of 2/18/2021 13:30   Ref  Range 12/7/2020 12:58   WBC Latest Ref Range: 4 31 - 10 16 Thousand/uL 3 76 (L)   Red Blood Cell Count Latest Ref Range: 3 88 - 5 62 Million/uL 4 80   Hemoglobin Latest Ref Range: 12 0 - 17 0 g/dL 13 9   HCT Latest Ref Range: 36 5 - 49 3 % 43 7   MCV Latest Ref Range: 82 - 98 fL 91   MCH Latest Ref Range: 26 8 - 34 3 pg 29 0   MCHC Latest Ref Range: 31 4 - 37 4 g/dL 31 8   RDW Latest Ref Range: 11 6 - 15 1 % 15 5 (H)   Platelet Count Latest Ref Range: 149 - 390 Thousands/uL 204   MPV Latest Ref Range: 8 9 - 12 7 fL 11 4   nRBC Latest Units: /100 WBCs 0     Physical Exam    Airway    Mallampati score: III  TM Distance: >3 FB  Neck ROM: full     Dental   Comment: Edentulous   ,     Cardiovascular      Pulmonary      Other Findings   Scleral icterus         Anesthesia Plan  ASA Score- 3     Anesthesia Type- general with ASA Monitors  Additional Monitors:   Airway Plan: ETT  Plan Factors-Exercise tolerance (METS): >4 METS  Chart reviewed  EKG reviewed  Imaging results reviewed  Existing labs reviewed  Patient summary reviewed  Induction- intravenous  Postoperative Plan- Plan for postoperative opioid use  Planned trial extubation    Informed Consent- Anesthetic plan and risks discussed with patient  I personally reviewed this patient with the CRNA  Discussed and agreed on the Anesthesia Plan with the CRNA  Lucas Wallace

## 2021-02-22 ENCOUNTER — TELEPHONE (OUTPATIENT)
Dept: GASTROENTEROLOGY | Facility: MEDICAL CENTER | Age: 31
End: 2021-02-22

## 2021-02-22 NOTE — TELEPHONE ENCOUNTER
Patients aunt called wanting to speak with Dr Inga Leonard  I advised that she is not listed on the patients communication consent so we would not be able to speak with her in regards to the patients care without the patients consent  Patients aunt stated that he is very depressed with everything going on and she would like to make sure he fully understands everything that is happening and his next steps   I advised her that Dr Inga Leonard did speak with the patient and if he has any additional questions to call our office

## 2021-02-22 NOTE — TELEPHONE ENCOUNTER
Patient called in and stated that he would like to speak with Dr Jeremy Coe in regards to hospitals  He stated that he was told to call by Dr Garrett Rivera last Thursday after his ERCP

## 2021-02-22 NOTE — TELEPHONE ENCOUNTER
Thank you  I spoke with him  I contacted Dr Dennie Gallo at [facility] letting her know the procedure last week was unable to reach his hepatobiliary anastomosis  She will discuss with the team at [facility] and review his imaging as well  We discussed that overall he may need a liver transplant in the future and [facility] will discuss timing of this, and whether  vs living donor evaluation

## 2021-02-26 ENCOUNTER — TELEPHONE (OUTPATIENT)
Dept: GASTROENTEROLOGY | Facility: MEDICAL CENTER | Age: 31
End: 2021-02-26

## 2021-02-26 NOTE — TELEPHONE ENCOUNTER
I called the patient back  I told him I spoke with Dr Riky Peguero from [facility] who said that they will contact him regarding scheduling repeat ERCP attempt to reach his anastomosis  He stated he received a call from Dr Riky Peguero this afternoon and they will be calling him next week to schedule

## 2021-03-01 ENCOUNTER — TELEPHONE (OUTPATIENT)
Dept: GASTROENTEROLOGY | Facility: MEDICAL CENTER | Age: 31
End: 2021-03-01

## 2021-03-01 NOTE — TELEPHONE ENCOUNTER
----- Message from Milagros Torres MD sent at 2/25/2021 11:44 AM EST -----  Please call patient :  Benign polyp in the esophagus removed  Stomach biopsies did not show any infection  Follow up with Dr Sharri Schmitt

## 2021-03-01 NOTE — TELEPHONE ENCOUNTER
TERRIE    Called dylan and he received a phone call this morning to let him know [facility] does not take his insurance [id number] referral placed 12/10/2020  Referral for transplant closed 12/22/2020, but he was seeing Dr Magdalene Valadez in Hepatology       [place]et says will be going to [place]  He needs a referral placed and records sent to [facility]  Advised him to also call [facility] for records to be sent to [place]  He's upset about having to go somewhere else  Something did not seem quite right  Tila's referral was placed 12/10/20 and was cleared by financial  He had 3 appointments with Dr Alexandre Tony and now he has outstanding debt  I called and spoke to Hepatology and asked about patient's insurance  They were confused and gave me the the [place] ID number as his insurance number  It was what they used to bill claims  ??    Tila will call [facility] billing tomorrow to inquire about his insurance info  I have conference call with [facility] transplant team on Wednesday  I will bring this up also  He has been working with [facility] for the last 2 month and all of a sudden, his insurance is not taken  Patient will call me after he talks with [facility]  I'd like to make sure he must change to [place], before we place referral and fax info tothem

## 2021-03-01 NOTE — TELEPHONE ENCOUNTER
Pt called stating he received a call from [facility] stating they will need his records faxed to them at [phone] so they can view them and determine if they will schedule him an appt

## 2021-03-02 NOTE — TELEPHONE ENCOUNTER
Yes, he has had a telemedicine visit with [facility] and I recently spoke with Dr Kori Fernandes who was going to arrange for an ERCP for him and to start listing him for transplant  She did not mention any issues with his insurance at that time  Thank you for looking into it

## 2021-03-02 NOTE — TELEPHONE ENCOUNTER
TERRIE    Spoke with dylan  He did not get through to [facility] billing last night  A male nurse called him yesterday and told him someone will call him today to schedule the ERCP  He is thankful that [facility] will schedule the ERCP  Apparently everything is good with liver transpalnt program  He also has an appt with you on 3    I have a Conference call tomorrow with [facility]  I will ask them if they can look into this for me

## 2021-03-03 NOTE — TELEPHONE ENCOUNTER
Please see encounter form 2/26  Spoke with patient daily for the last 3 days, trying to understand his insurance problem

## 2021-03-03 NOTE — TELEPHONE ENCOUNTER
TERRIE    Had [facility] conference this morning for updates  Questioned about his insurance not being par with [facility]  Sagar Carter, who actually spoke to kami yesterday, told me that Liver Transplant does not take [facility]  but Hepatology does  He received a call today from [facility]  He has his ERCP scheduled for tomorrow, but now his insurance does not cover the hospital where he was going to have his ERCP  He was told someone will contact Dr Jamaal Bates to see what can be done  Dario Bolaños called me to help, but I cannot help him with this  He understands

## 2021-03-03 NOTE — TELEPHONE ENCOUNTER
Thank you   It sounds like [facility] needs to be the contact to help settle this issue, whether he be can continue to be seen by hepatology and/or transplant there

## 2021-03-09 NOTE — TELEPHONE ENCOUNTER
Christina Pierre is asking for help to find out what is going on at [facility] with the EUS  He is getting inconsistent information  He has the ERCP scheduled, then it was cancelled - he did not know why  Apparently his insurance covers Hepatology, but not Liver transplant  [address] and left message to get a call back to discuss

## 2021-03-09 NOTE — TELEPHONE ENCOUNTER
TERRIE Cox, Hepatology physician, called this morning to explain what is going on at [facility]  She states, this is an insurance concern  Hepatology takes [place] but Liver Transplant does not  Any procedures are considered with the Liver Transplant practice, not Hepatology  Dr Cleveland Damon does not understand why the insurance works like this between Hepatology and liver Transplant  There are 3 facilities he is able to go to and patient is choosing [facility], Dr Cleveland Damon has contacts at [facility] and is willing to get him established at [facility]  Dr Cleveland Damon will call patient today  I spoke to patient and he is aware Dr Cleveland Damon will be calling him  He is eager to go to [facility]

## 2021-03-15 ENCOUNTER — LAB (OUTPATIENT)
Dept: LAB | Facility: MEDICAL CENTER | Age: 31
End: 2021-03-15
Payer: COMMERCIAL

## 2021-03-15 ENCOUNTER — OFFICE VISIT (OUTPATIENT)
Dept: GASTROENTEROLOGY | Facility: MEDICAL CENTER | Age: 31
End: 2021-03-15
Payer: COMMERCIAL

## 2021-03-15 VITALS
BODY MASS INDEX: 27.6 KG/M2 | SYSTOLIC BLOOD PRESSURE: 103 MMHG | TEMPERATURE: 98.7 F | HEART RATE: 75 BPM | DIASTOLIC BLOOD PRESSURE: 67 MMHG | WEIGHT: 150 LBS | HEIGHT: 62 IN

## 2021-03-15 DIAGNOSIS — Z87.19 HISTORY OF BILIARY ATRESIA: ICD-10-CM

## 2021-03-15 DIAGNOSIS — K74.60 CIRRHOSIS OF LIVER WITHOUT ASCITES, UNSPECIFIED HEPATIC CIRRHOSIS TYPE (HCC): Primary | ICD-10-CM

## 2021-03-15 DIAGNOSIS — E80.6 HYPERBILIRUBINEMIA: ICD-10-CM

## 2021-03-15 DIAGNOSIS — R63.4 WEIGHT LOSS, UNINTENTIONAL: ICD-10-CM

## 2021-03-15 DIAGNOSIS — K74.60 CIRRHOSIS OF LIVER WITHOUT ASCITES, UNSPECIFIED HEPATIC CIRRHOSIS TYPE (HCC): ICD-10-CM

## 2021-03-15 PROBLEM — K74.5 BILIARY CIRRHOSIS (HCC): Status: ACTIVE | Noted: 2021-03-15

## 2021-03-15 LAB
ALBUMIN SERPL BCP-MCNC: 3.5 G/DL (ref 3.5–5)
ALP SERPL-CCNC: 965 U/L (ref 46–116)
ALT SERPL W P-5'-P-CCNC: 91 U/L (ref 12–78)
AST SERPL W P-5'-P-CCNC: 125 U/L (ref 5–45)
BASOPHILS # BLD AUTO: 0.02 THOUSANDS/ΜL (ref 0–0.1)
BASOPHILS NFR BLD AUTO: 1 % (ref 0–1)
BILIRUB DIRECT SERPL-MCNC: 6.4 MG/DL (ref 0–0.2)
BILIRUB SERPL-MCNC: 7.76 MG/DL (ref 0.2–1)
EOSINOPHIL # BLD AUTO: 0.18 THOUSAND/ΜL (ref 0–0.61)
EOSINOPHIL NFR BLD AUTO: 4 % (ref 0–6)
ERYTHROCYTE [DISTWIDTH] IN BLOOD BY AUTOMATED COUNT: 15.4 % (ref 11.6–15.1)
HCT VFR BLD AUTO: 46.2 % (ref 36.5–49.3)
HGB BLD-MCNC: 14.6 G/DL (ref 12–17)
IGA SERPL-MCNC: 475 MG/DL (ref 70–400)
IMM GRANULOCYTES # BLD AUTO: 0.02 THOUSAND/UL (ref 0–0.2)
IMM GRANULOCYTES NFR BLD AUTO: 1 % (ref 0–2)
INR PPP: 1.17 (ref 0.84–1.19)
LYMPHOCYTES # BLD AUTO: 0.8 THOUSANDS/ΜL (ref 0.6–4.47)
LYMPHOCYTES NFR BLD AUTO: 19 % (ref 14–44)
MCH RBC QN AUTO: 28.6 PG (ref 26.8–34.3)
MCHC RBC AUTO-ENTMCNC: 31.6 G/DL (ref 31.4–37.4)
MCV RBC AUTO: 91 FL (ref 82–98)
MONOCYTES # BLD AUTO: 0.28 THOUSAND/ΜL (ref 0.17–1.22)
MONOCYTES NFR BLD AUTO: 7 % (ref 4–12)
NEUTROPHILS # BLD AUTO: 2.88 THOUSANDS/ΜL (ref 1.85–7.62)
NEUTS SEG NFR BLD AUTO: 68 % (ref 43–75)
NRBC BLD AUTO-RTO: 0 /100 WBCS
PLATELET # BLD AUTO: 192 THOUSANDS/UL (ref 149–390)
PMV BLD AUTO: 12.3 FL (ref 8.9–12.7)
PROT SERPL-MCNC: 8.1 G/DL (ref 6.4–8.2)
PROTHROMBIN TIME: 14.9 SECONDS (ref 11.6–14.5)
RBC # BLD AUTO: 5.1 MILLION/UL (ref 3.88–5.62)
TSH SERPL DL<=0.05 MIU/L-ACNC: 1.44 UIU/ML (ref 0.36–3.74)
WBC # BLD AUTO: 4.18 THOUSAND/UL (ref 4.31–10.16)

## 2021-03-15 PROCEDURE — 85025 COMPLETE CBC W/AUTO DIFF WBC: CPT

## 2021-03-15 PROCEDURE — 36415 COLL VENOUS BLD VENIPUNCTURE: CPT

## 2021-03-15 PROCEDURE — 85610 PROTHROMBIN TIME: CPT

## 2021-03-15 PROCEDURE — 80076 HEPATIC FUNCTION PANEL: CPT

## 2021-03-15 PROCEDURE — 84443 ASSAY THYROID STIM HORMONE: CPT

## 2021-03-15 PROCEDURE — 83516 IMMUNOASSAY NONANTIBODY: CPT

## 2021-03-15 PROCEDURE — 82784 ASSAY IGA/IGD/IGG/IGM EACH: CPT

## 2021-03-15 PROCEDURE — 99214 OFFICE O/P EST MOD 30 MIN: CPT | Performed by: INTERNAL MEDICINE

## 2021-03-15 NOTE — PROGRESS NOTES
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  He previously had heavy alcohol use but has abstained since September 2020  Transplant status:  He was evaluated by [facility] hepatology with plans for listing evaluation for transplant  However his insurance is not accepted by the transplant program   [facility] is working on transferring his information for evaluation at [facility]       Portal hypertension: EGD February 2021 without varices  Repeat in 1 year  Ascites:  Portosystemic encephalopathy:  None   HCC screening:  December 2020 triphasic MRI and AFP were unremarkable  Repeat in June 2021  - Hepatic function panel; Future  - Protime-INR; Future  - CBC and differential; Future      4  Weight loss, unintentional  He has had a 15 lb unintentional weight loss over the last is in he endorses a total of 30 lb over the last 6 months  His endoscopy recently was unremarkable for etiology  I have ordered serologic testing for celiac disease and thyroid function  Colonoscopy will be performed for evaluation  I discussed the importance of nutrition, increasing protein and drinking nutrition supplements given potential evaluation for transplant in the future  - Tissue transglutaminase, IgA; Future  - IgA; Future  - TSH, 3rd generation with Free T4 reflex; Future  - Colonoscopy;  Future    Follow up in 3 months  ______________________________________________________________________    SUBJECTIVE: 58-year-old male with 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, alcohol abuse, cirrhosis who presents for follow-up evaluation  Interval history:  He underwent attempted ERCP February 2021  The Enterra enteric anastomosis was visualized but the hepatobiliary limb could not be accessed and the biliary enteric anastomosis could not be visualized  Gastric biopsies showed neck nonspecific inflammation negative for H pylori  Esophageal biopsy showed squamous papilloma  There were no esophageal varices visualized on the exam     He reports improvement of his pruritus  It now rarely occurs and usually on his legs  His appetite remains low  Since the visit he has lost 15 lb unintentionally  He reports a total of a 30 lb weight loss since the fall of last year  He denies abdominal pain  He reports nausea and intermittent vomiting with eating certain types of foods  His regular, formed bowel movements without melena or hematochezia  He has tried over-the-counter nutrition supplements a few times in the past  He was evaluated [facility] hepatology February 2, 2021 with plans to have MRI reviewed at their liver conference to to see endoscopic intervention for obstruction  He was subsequently contacted by [facility] that he would be considered to undergo listing for transplant however the Transplant Department does not accept his insurance  [facility] is now working to transfer his records to [address] [place] [address]  Prior history:   He was seen in the GI office December 2020   At that time he had previously presented to an outside hospital with jaundice   At the outside hospital he underwent MRCP showing no biliary ductal dilation, mild hepatic nodularity suggestive of cirrhosis and splenomegaly   He also underwent EGD showing portal hypertensive gastropathy no gastric or esophageal varices   His total bilirubin continue to increase after discharge  Mamadou Graf additionally was having heavy alcohol use with a case of beer per day for 3-6 months prior to his presentation and has stopped drinking in September 2020      He underwent repeat MRI/MRCP 12/08/2020 showing moderate central and mild peripheral intrahepatic biliary ductal dilatation with diffusely irregular appearance of the biliary tree   His small-bowel/biliary anastomosis could not be visualized   Additionally there was splenomegaly and prominent portal vein, cirrhotic appearing liver      Prior workup for genetic/metabolic and autoimmune causes of liver disease were unremarkable  REVIEW OF SYSTEMS IS OTHERWISE NEGATIVE  Ten point review of systems is negative other than status per HPI    Historical Information   Past Medical History:   Diagnosis Date    Biliary atresia     Cirrhosis ([address] )      History reviewed  No pertinent surgical history  Social History   Social History     Substance and Sexual Activity   Alcohol Use Never    Frequency: Never     Social History     Substance and Sexual Activity   Drug Use Not Currently     Social History     Tobacco Use   Smoking Status Never Smoker   Smokeless Tobacco Never Used     No family history on file  Meds/Allergies       Current Outpatient Medications:     omeprazole (PriLOSEC) 20 mg delayed release capsule    hydrOXYzine HCL (ATARAX) 25 mg tablet    No Known Allergies        Objective     Blood pressure 103/67, pulse 75, temperature 98 7 °F (37 1 °C), temperature source Tympanic, height 5' 2" (1 575 m), weight 68 kg (150 lb)  Body mass index is 27 44 kg/m²  PHYSICAL EXAM:      General Appearance:   Alert, cooperative, no distress   HEENT:   Normocephalic, atraumatic, icteric sclera     Neck:  Supple, symmetrical, trachea midline   Lungs:   Clear to auscultation bilaterally; no rales, rhonchi or wheezing; respirations unlabored    Heart[de-identified]   Regular rate and rhythm; no murmur, rub, or gallop     Abdomen:   Soft, non-tender, non-distended; normal bowel sounds; no masses, no organomegaly Genitalia:   Deferred    Rectal:   Deferred    Extremities:  No cyanosis, clubbing or edema    Pulses:  2+ and symmetric    Skin:  Jaundice with scattered spider angioma on the upper chest   Lymph nodes:  No palpable cervical lymphadenopathy        Lab Results:   No visits with results within 1 Day(s) from this visit  Latest known visit with results is:   Hospital Outpatient Visit on 02/18/2021   Component Date Value    Case Report 02/18/2021                      Value:Surgical Pathology Report                         Case: [id number]                                   Authorizing Provider:  Hugo Rubin MD             Collected:           02/18/2021 1652              Ordering Location:     [address] [place] [address]      Received:            02/19/2021 [address] [facility]                                                           Pathologist:           Felipe Hilliard MD                                                        Specimens:   A) - Stomach, r/o hpylori                                                                           B) - Polyp, Esophageal, Cold snare                                                         Final Diagnosis 02/18/2021                      Value: This result contains rich text formatting which cannot be displayed here   Additional Information 02/18/2021                      Value: This result contains rich text formatting which cannot be displayed here  Ethelyn Delta Gross Description 02/18/2021                      Value: This result contains rich text formatting which cannot be displayed here  Radiology Results:   Xr Abdomen 1 View Kub    Result Date: 2/27/2021  Narrative: C arm - abdomen INDICATION: E80 6: Other disorders of bilirubin metabolism  Procedure guidance  COMPARISON:  None IMAGES:  6 FLUOROSCOPY TIME:   1 min 20 seconds ft TECHNIQUE: FINDINGS: Fluoroscopy was provided for procedure guidance       Impression: Fluoroscopy provided for procedure guidance  Please see separate procedure note for details   Workstation performed: [id number]

## 2021-03-15 NOTE — PATIENT INSTRUCTIONS
The patient is scheduled at [facility] for a colon with Dr Troy Morales on 4/29/2021  Miralax/dulcolax prep instructions have been gone over in the office, with the patient, by the MA  The patient is aware that they will receive a call with the arrival time the day prior to procedure and that they will need a  the day of the procedure   I have asked the patient to call with any questions that they might have prior to procedure

## 2021-03-16 LAB — TTG IGA SER-ACNC: <2 U/ML (ref 0–3)

## 2021-03-16 NOTE — RESULT ENCOUNTER NOTE
My medical assistant will call patient with results  Bilirubin level is decreased to 7 now from 10 before  The rest of his labs are stable

## 2021-03-26 ENCOUNTER — TRANSCRIBE ORDERS (OUTPATIENT)
Dept: GASTROENTEROLOGY | Facility: CLINIC | Age: 31
End: 2021-03-26

## 2021-04-28 RX ORDER — SODIUM CHLORIDE 9 MG/ML
50 INJECTION, SOLUTION INTRAVENOUS CONTINUOUS
Status: CANCELLED | OUTPATIENT
Start: 2021-04-28

## 2021-04-29 ENCOUNTER — HOSPITAL ENCOUNTER (OUTPATIENT)
Dept: GASTROENTEROLOGY | Facility: HOSPITAL | Age: 31
Setting detail: OUTPATIENT SURGERY
Discharge: HOME | End: 2021-04-29
Attending: INTERNAL MEDICINE

## 2021-07-01 NOTE — TELEPHONE ENCOUNTER
E mailed \[facility]\"" for update on referral   E mail response patient's insurance not accepted at \[facility]\""  Patient decided to follow up at [facility]  Dr Sharonda Rodriguez 4/26/21 office visit note scanned into chart

## 2021-08-06 NOTE — TELEPHONE ENCOUNTER
Spoke to Jocelin Briggs [facility] liver transplant coordinator  Patient is followed in their cirrhosis clinic  As per 4/26/21 office note Dr Wilver Palomino- follow up 3-4 months and consider referring to transplant